# Patient Record
Sex: FEMALE | Race: WHITE | NOT HISPANIC OR LATINO | Employment: FULL TIME | URBAN - METROPOLITAN AREA
[De-identification: names, ages, dates, MRNs, and addresses within clinical notes are randomized per-mention and may not be internally consistent; named-entity substitution may affect disease eponyms.]

---

## 2023-03-07 ENCOUNTER — INITIAL PRENATAL (OUTPATIENT)
Dept: OBSTETRICS AND GYNECOLOGY | Facility: CLINIC | Age: 43
End: 2023-03-07
Payer: COMMERCIAL

## 2023-03-07 VITALS
BODY MASS INDEX: 22.86 KG/M2 | WEIGHT: 129 LBS | SYSTOLIC BLOOD PRESSURE: 97 MMHG | DIASTOLIC BLOOD PRESSURE: 66 MMHG | HEIGHT: 63 IN

## 2023-03-07 DIAGNOSIS — Z98.891 HISTORY OF C-SECTION: ICD-10-CM

## 2023-03-07 DIAGNOSIS — Z32.00 POSSIBLE PREGNANCY, NOT CONFIRMED: ICD-10-CM

## 2023-03-07 DIAGNOSIS — Z12.4 CERVICAL CANCER SCREENING: ICD-10-CM

## 2023-03-07 DIAGNOSIS — Z3A.01 LESS THAN 8 WEEKS GESTATION OF PREGNANCY: Primary | ICD-10-CM

## 2023-03-07 DIAGNOSIS — O09.521 MULTIGRAVIDA OF ADVANCED MATERNAL AGE IN FIRST TRIMESTER: ICD-10-CM

## 2023-03-07 DIAGNOSIS — Z34.91 PRENATAL CARE IN FIRST TRIMESTER: ICD-10-CM

## 2023-03-07 DIAGNOSIS — O28.3 ABNORMAL PRENATAL ULTRASOUND: ICD-10-CM

## 2023-03-07 LAB
B-HCG UR QL: POSITIVE
EXPIRATION DATE: ABNORMAL
GLUCOSE UR STRIP-MCNC: NEGATIVE MG/DL
INTERNAL NEGATIVE CONTROL: NEGATIVE
INTERNAL POSITIVE CONTROL: POSITIVE
Lab: ABNORMAL
PROT UR STRIP-MCNC: NEGATIVE MG/DL

## 2023-03-07 NOTE — PROGRESS NOTES
Initial OB Visit    Chief Complaint   Patient presents with   • Initial Prenatal Visit     Last pap 2020 normal        Anabelle Sandra is being seen today for her first obstetrical visit.  She is a 42 y.o.  at 8w5d gestation by LMP 23.     This is a planned pregnancy.   OB History    Para Term  AB Living   2 1 1     1   SAB IAB Ectopic Molar Multiple Live Births                    # Outcome Date GA Lbr Armen/2nd Weight Sex Delivery Anes PTL Lv   2 Current            1 Term 21   3062 g (6 lb 12 oz) F CS-Unspec          Current obstetric complaints: She reports having food aversions, nausea, and fatigue. No vomiting. Denies vaginal bleeding or abdominal cramping.    Prior obstetric issues, potential pregnancy concerns:    G1- Primary  section at term for non-reassuring fetal heart tracing. Developed fever during labor and got diagnosed with chorioamnionitis and received antibiotics. She had dilated to 8 cm. She was at [facility] in [place], [place].    G2- current      Family history of genetic issues (includes FOB): denies   Prior infections concerning in pregnancy (Rash, fever since LMP): denies   Varicella Hx: She has had the chicken pox as a kid.  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 History of HSV in self or partner? Denies   Prepregnancy weight: 129 lb     History reviewed. No pertinent past medical history.    Past Surgical History:   Procedure Laterality Date   •  SECTION         No current outpatient medications on file.    Allergies   Allergen Reactions   • Penicillins Hives   • Adhesive Tape Itching and Rash       Social History     Socioeconomic History   • Marital status:    Tobacco Use   • Smoking status: Never   Vaping Use   • Vaping Use: Never used   Substance and Sexual Activity   • Alcohol use: Not Currently   • Drug use: Never   • Sexual activity:  "Yes       History reviewed. No pertinent family history.    Review of systems     Constitutional: negative for chills, fevers and positive for fatigue  Eyes: negative  Ears, nose, mouth, throat, and face: negative for hearing loss and nasal congestion  Respiratory: negative for asthma and wheezing  Cardiovascular: negative for chest pain and dyspnea  Gastrointestinal: negative for dyspepsia, dysphagia abdominal pain  Genitourinary:negative for urinary incontinence  Integument/breast: negative for breast lump  Hematologic/lymphatic: negative for bleeding  Musculoskeletal:negative for aches  Neurological: negative for numbness/tingling  Behavioral/Psych: negative for anhedonia  Allergic/Immunologic: negative for rash, allergy    Objective    BP 97/66   Ht 160 cm (63\")   Wt 58.5 kg (129 lb)   LMP 01/05/2023   BMI 22.85 kg/m²     General Appearance:    Alert, cooperative, in no acute distress   Head:    Normocephalic, without obvious abnormality, atraumatic   Eyes:            Lids and lashes normal, conjunctivae and sclerae normal, no   icterus, no pallor, corneas clear   Ears:    Ears appear intact with no abnormalities noted   Neck:   No adenopathy, supple, trachea midline, no thyromegaly   Back:     No kyphosis present, no scoliosis present,                       Lungs:     No increased work of breathing, regular respirations     Heart:    Normal rate    Breast Exam:    No masses, No nipple discharge   Abdomen:     No masses, no organomegaly, soft, non-tender, non-distended, no guarding, no rebound tenderness   Genitalia:    Vulva - BUS-WNL, NEFG    Vagina - No discharge, No bleeding    Cervix - No Lesions, closed     Uterus - Consistent with 6 weeks    Adnexa - No masses, NT    Pelvimetry - clinically adequate, gynecoid pelvis     Extremities:   Moves all extremities well, no edema, no cyanosis, no              redness   Pulses:   Pulses palpable and equal bilaterally   Skin:   No bleeding, bruising or rash "   Lymph nodes:   No palpable adenopathy   Neurologic:   Sensation intact, A&O times 3      Assessment  1. Pregnancy at 7w1d by 7 week ultrasound   2. Prenatal care in first trimester  3. History of C/S x 1   4. AMA   5. Irregular cystic masses on ultrasound   6. Cervical cancer screening     Plan    - Initial labs ordered including OB panel with HIV, Varicella Zoster antibody, urine culture, urine drug screen, GC/CT/Trich probe, and pap smear with HPV cotesting.   - Ordered pelvic ultrasound to confirm pregnancy dating and it noted fetus measuring 7w1d by CRL with fetal cardiac activity noted. There was irregular cystic masses surrounding gestational sac that might represent partial molar pregnancy. Will obtain HCG level today in addition. Reviewed Uptodate that discussed that there may not be same elevated HCG levels that are typically associated  with complete molar pregnancy. Will plan to repeat ultrasound in 2 weeks for fetal viability and reevaluate irregular cystic masses surrounding the gestational sac.   - Will attempt to review records for [facility] regarding past delivery but was unable to find hospital on first attempt. Patient desires TOLAC and I would like to confirm via operative report that she had a low transverse  section during her first pregnancy. Reviewed risks of TOLAC including ~1% of uterine rupture that could lead to catastrophic consequences including placental abruption with subsequent fetal death. She is aware of risks and wishes to proceed with TOLAC which I do feel like she would be a good candidate given that her delivery was due to non-reassuring fetal heart tracing and she was dilating.   - Will plan to offer cell free DNA testing after 10 weeks given AMA.   - Will recommend starting ASA 81 mg daily given AMA status to help reduce risk of developing preeclampsia.   - Patient is on Prenatal vitamins.   - Problem list reviewed and updated.  - Reviewed routine prenatal care  with the office to include but not limited to: not to changing cat litter, food restrictions, avoidance of alcohol, tobacco and drugs and saunas/hot tubs, anticipated weight gain/nutrition requirements.  Reviewed nature of practice and hospital.  Reviewed recommended follow up, importance of compliance with care. We reviewed testing in pregnancy including HIV testing and urine drug screen.      We reviewed that at this time, her BMI is classified as BMI 18.5-24.9       Classification: normal weight.  We reviewed that in pregnancy, her recommended weight gain is 25-35 lb.  In the first trimester, caloric demand is typically not increased.  In the second and third trimester, the increased demand is approximately 350 and 450 calories respectively.    All questions answered.   Follow up in 2 weeks for fetal viability ultrasound and prenatal appointment.     Kusum De Leon MD

## 2023-03-08 LAB
ABO GROUP BLD: NORMAL
BASOPHILS # BLD AUTO: 0.1 X10E3/UL (ref 0–0.2)
BASOPHILS NFR BLD AUTO: 1 %
BLD GP AB SCN SERPL QL: NEGATIVE
EOSINOPHIL # BLD AUTO: 0.2 X10E3/UL (ref 0–0.4)
EOSINOPHIL NFR BLD AUTO: 2 %
ERYTHROCYTE [DISTWIDTH] IN BLOOD BY AUTOMATED COUNT: 12.7 % (ref 11.7–15.4)
HBV SURFACE AG SERPL QL IA: NEGATIVE
HCG INTACT+B SERPL-ACNC: NORMAL MIU/ML
HCT VFR BLD AUTO: 36.8 % (ref 34–46.6)
HCV IGG SERPL QL IA: NON REACTIVE
HGB BLD-MCNC: 12.5 G/DL (ref 11.1–15.9)
HIV 1+2 AB+HIV1 P24 AG SERPL QL IA: NON REACTIVE
IMM GRANULOCYTES # BLD AUTO: 0 X10E3/UL (ref 0–0.1)
IMM GRANULOCYTES NFR BLD AUTO: 0 %
LYMPHOCYTES # BLD AUTO: 2.9 X10E3/UL (ref 0.7–3.1)
LYMPHOCYTES NFR BLD AUTO: 36 %
MCH RBC QN AUTO: 32.2 PG (ref 26.6–33)
MCHC RBC AUTO-ENTMCNC: 34 G/DL (ref 31.5–35.7)
MCV RBC AUTO: 95 FL (ref 79–97)
MONOCYTES # BLD AUTO: 0.6 X10E3/UL (ref 0.1–0.9)
MONOCYTES NFR BLD AUTO: 7 %
NEUTROPHILS # BLD AUTO: 4.2 X10E3/UL (ref 1.4–7)
NEUTROPHILS NFR BLD AUTO: 54 %
PLATELET # BLD AUTO: 190 X10E3/UL (ref 150–450)
RBC # BLD AUTO: 3.88 X10E6/UL (ref 3.77–5.28)
RH BLD: POSITIVE
RPR SER QL: NON REACTIVE
RUBV IGG SERPL IA-ACNC: 3.79 INDEX
VZV IGG SER IA-ACNC: 3211 INDEX
WBC # BLD AUTO: 8 X10E3/UL (ref 3.4–10.8)

## 2023-03-09 LAB
AMPHETAMINES UR QL SCN: NEGATIVE NG/ML
BACTERIA UR CULT: NO GROWTH
BACTERIA UR CULT: NORMAL
BARBITURATES UR QL SCN: NEGATIVE NG/ML
BENZODIAZ UR QL SCN: NEGATIVE NG/ML
BZE UR QL SCN: NEGATIVE NG/ML
CANNABINOIDS UR QL SCN: NEGATIVE NG/ML
CREAT UR-MCNC: 129.1 MG/DL (ref 20–300)
LABORATORY COMMENT REPORT: ABNORMAL
METHADONE UR QL SCN: NEGATIVE NG/ML
OPIATES UR QL SCN: POSITIVE NG/ML
OXYCODONE+OXYMORPHONE UR QL SCN: NEGATIVE NG/ML
PCP UR QL: NEGATIVE NG/ML
PH UR: 8.2 [PH] (ref 4.5–8.9)
PROPOXYPH UR QL SCN: NEGATIVE NG/ML

## 2023-03-13 LAB
C TRACH RRNA CVX QL NAA+PROBE: NEGATIVE
CYTOLOGIST CVX/VAG CYTO: NORMAL
CYTOLOGY CVX/VAG DOC CYTO: NORMAL
CYTOLOGY CVX/VAG DOC THIN PREP: NORMAL
DX ICD CODE: NORMAL
HIV 1 & 2 AB SER-IMP: NORMAL
HPV GENOTYPE REFLEX: NORMAL
HPV I/H RISK 4 DNA CVX QL PROBE+SIG AMP: NEGATIVE
N GONORRHOEA RRNA CVX QL NAA+PROBE: NEGATIVE
OTHER STN SPEC: NORMAL
STAT OF ADQ CVX/VAG CYTO-IMP: NORMAL
T VAGINALIS RRNA SPEC QL NAA+PROBE: NEGATIVE

## 2023-03-15 ENCOUNTER — TELEPHONE (OUTPATIENT)
Dept: OBSTETRICS AND GYNECOLOGY | Facility: CLINIC | Age: 43
End: 2023-03-15
Payer: COMMERCIAL

## 2023-03-23 ENCOUNTER — OFFICE VISIT (OUTPATIENT)
Dept: OBSTETRICS AND GYNECOLOGY | Facility: CLINIC | Age: 43
End: 2023-03-23
Payer: COMMERCIAL

## 2023-03-23 VITALS
SYSTOLIC BLOOD PRESSURE: 113 MMHG | BODY MASS INDEX: 22.86 KG/M2 | HEIGHT: 63 IN | DIASTOLIC BLOOD PRESSURE: 72 MMHG | WEIGHT: 129 LBS

## 2023-03-23 DIAGNOSIS — O02.1 MISSED ABORTION: Primary | ICD-10-CM

## 2023-03-23 NOTE — H&P (VIEW-ONLY)
"Chief Complaint   Patient presents with   • Follow-up     MAB        SUBJECTIVE:     Anabelle Sandra is a 42 y.o.  who presents for fetal viability ultrasound that noted missed . The patient was last seen on 3/7/23 for new OB visit and was brought back today for viability ultrasound given irregular cystic masses surrounding gestational sac on initial dating ultrasound. The patient denies vaginal bleeding, abdominal cramping, nausea or vomiting. She is accompanied by her  today.     Past Medical History:   Diagnosis Date   • Dislocated shoulder 2022    right   • Missed  2023   • Shoulder instability, right       Past Surgical History:   Procedure Laterality Date   •  SECTION        Social History     Tobacco Use   • Smoking status: Never   Vaping Use   • Vaping Use: Never used   Substance Use Topics   • Alcohol use: Not Currently   • Drug use: Never     OB History    Para Term  AB Living   2 1 1     1   SAB IAB Ectopic Molar Multiple Live Births                    # Outcome Date GA Lbr Armen/2nd Weight Sex Delivery Anes PTL Lv   2 Current            1 Term 21   3062 g (6 lb 12 oz) F CS-Unspec           Review of Systems   Gastrointestinal: Negative for abdominal pain.   Genitourinary: Negative for vaginal bleeding and vaginal discharge.       OBJECTIVE:   Vitals:    23 1034   BP: 113/72   Weight: 58.5 kg (129 lb)   Height: 160 cm (62.99\")        Physical Exam  Vitals reviewed.   Constitutional:       General: She is not in acute distress.  HENT:      Head: Normocephalic and atraumatic.      Right Ear: External ear normal.      Left Ear: External ear normal.   Eyes:      Extraocular Movements: Extraocular movements intact.      Pupils: Pupils are equal, round, and reactive to light.   Pulmonary:      Effort: Pulmonary effort is normal. No respiratory distress.   Musculoskeletal:         General: No deformity. Normal range of motion.      Cervical " back: Normal range of motion and neck supple.   Skin:     General: Skin is warm and dry.   Neurological:      General: No focal deficit present.      Mental Status: She is alert and oriented to person, place, and time.   Psychiatric:         Mood and Affect: Mood normal.         Behavior: Behavior normal.         Thought Content: Thought content normal.         ASSESSMENT:     ICD-10-CM ICD-9-CM   1. Missed   O02.1 632       PLAN:   I reviewed ultrasound results with the patient and her partner and provided my condolences for their loss. I discussed my concern regarding for partial molar pregnancy given irregular cystic structures that were initially noted on dating ultrasound and were again seen today. I reviewed that I cannot be definitively sure of this diagnosis until pathology has returned but I have a high suspicion based on ultrasound findings and subsequently fetal demise likely due to triploidy. Reviewed what a molar pregnancy is and that diagnosis will be acquired through pathology so suction dilation and curettage is recommended vs conservative therapy with expectant management or medical management. Patient agrees to proceed with suction D&C and reviewed procedure along with risks including but not limited to bleeding, infection, damage to surrounding structures with uterine perforation that could require additional surgery, and anesthesia risks. Will order Doxycycline 200 mg PO for antibiotic prophylaxis. Will obtain CBC, T&S, and HCG on day of surgery. All questions and concerns answered. Case request placed for suction dilation and curettage for management of missed  and suspicion for partial molar pregnancy.     Kusum De Leon MD

## 2023-03-24 ENCOUNTER — TELEPHONE (OUTPATIENT)
Dept: OBSTETRICS AND GYNECOLOGY | Facility: CLINIC | Age: 43
End: 2023-03-24
Payer: COMMERCIAL

## 2023-03-24 PROBLEM — O02.1 MISSED ABORTION: Status: ACTIVE | Noted: 2023-03-24

## 2023-03-24 RX ORDER — PRENATAL VIT/IRON FUM/FOLIC AC 27MG-0.8MG
1 TABLET ORAL DAILY
COMMUNITY

## 2023-03-24 RX ORDER — MELATONIN
1000 DAILY
COMMUNITY

## 2023-03-24 RX ORDER — VALACYCLOVIR HYDROCHLORIDE 500 MG/1
500 TABLET, FILM COATED ORAL DAILY PRN
COMMUNITY

## 2023-03-24 NOTE — TELEPHONE ENCOUNTER
Pt called via hub. She states she was supposed to hear today about her surgery. I did not see a case request for this in the pt's chart but I did see the day noted in the pt's chart from her visit yesterday. Please advise.    Thanks,  Sven

## 2023-03-24 NOTE — TELEPHONE ENCOUNTER
Patient is calling again to check on scheduling D&C surgery. She would like to have the surgery Monday now.     Thanks

## 2023-03-24 NOTE — TELEPHONE ENCOUNTER
----- Message from Anabelle Sandra sent at 3/24/2023 12:32 PM EDT -----  Regarding: Surgery  Contact: [phone]  Hi Dr De Leon,  I still haven’t heard from scheduling but the more I’ve read about this situation, the sooner I would like to have surgery. I know we originally requested Wednesday but can we do it Monday? I’d even come today if you’d let me lol  Please let me know. Just want to get it over with.   Thank you  Anabelle

## 2023-03-27 ENCOUNTER — ANESTHESIA EVENT (OUTPATIENT)
Dept: PERIOP | Facility: HOSPITAL | Age: 43
End: 2023-03-27
Payer: COMMERCIAL

## 2023-03-27 ENCOUNTER — HOSPITAL ENCOUNTER (OUTPATIENT)
Facility: HOSPITAL | Age: 43
Setting detail: HOSPITAL OUTPATIENT SURGERY
Discharge: HOME | End: 2023-03-27
Attending: STUDENT IN AN ORGANIZED HEALTH CARE EDUCATION/TRAINING PROGRAM | Admitting: STUDENT IN AN ORGANIZED HEALTH CARE EDUCATION/TRAINING PROGRAM
Payer: COMMERCIAL

## 2023-03-27 ENCOUNTER — ANESTHESIA (OUTPATIENT)
Dept: PERIOP | Facility: HOSPITAL | Age: 43
End: 2023-03-27
Payer: COMMERCIAL

## 2023-03-27 VITALS
OXYGEN SATURATION: 100 % | RESPIRATION RATE: 16 BRPM | DIASTOLIC BLOOD PRESSURE: 59 MMHG | WEIGHT: 129 LBS | HEART RATE: 55 BPM | SYSTOLIC BLOOD PRESSURE: 105 MMHG | BODY MASS INDEX: 22.86 KG/M2 | TEMPERATURE: 98.2 F | HEIGHT: 63 IN

## 2023-03-27 DIAGNOSIS — O02.1 MISSED ABORTION: ICD-10-CM

## 2023-03-27 LAB
ABO GROUP BLD: NORMAL
BASOPHILS # BLD AUTO: 0.06 10*3/MM3 (ref 0–0.2)
BASOPHILS NFR BLD AUTO: 0.7 % (ref 0–1.5)
BLD GP AB SCN SERPL QL: NEGATIVE
DEPRECATED RDW RBC AUTO: 45.1 FL (ref 37–54)
EOSINOPHIL # BLD AUTO: 0.16 10*3/MM3 (ref 0–0.4)
EOSINOPHIL NFR BLD AUTO: 1.9 % (ref 0.3–6.2)
ERYTHROCYTE [DISTWIDTH] IN BLOOD BY AUTOMATED COUNT: 12.8 % (ref 12.3–15.4)
HCT VFR BLD AUTO: 38.9 % (ref 34–46.6)
HGB BLD-MCNC: 12.8 G/DL (ref 12–15.9)
IMM GRANULOCYTES # BLD AUTO: 0.03 10*3/MM3 (ref 0–0.05)
IMM GRANULOCYTES NFR BLD AUTO: 0.4 % (ref 0–0.5)
LYMPHOCYTES # BLD AUTO: 2.8 10*3/MM3 (ref 0.7–3.1)
LYMPHOCYTES NFR BLD AUTO: 33.3 % (ref 19.6–45.3)
MCH RBC QN AUTO: 31.8 PG (ref 26.6–33)
MCHC RBC AUTO-ENTMCNC: 32.9 G/DL (ref 31.5–35.7)
MCV RBC AUTO: 96.5 FL (ref 79–97)
MONOCYTES # BLD AUTO: 0.53 10*3/MM3 (ref 0.1–0.9)
MONOCYTES NFR BLD AUTO: 6.3 % (ref 5–12)
NEUTROPHILS NFR BLD AUTO: 4.83 10*3/MM3 (ref 1.7–7)
NEUTROPHILS NFR BLD AUTO: 57.4 % (ref 42.7–76)
NRBC BLD AUTO-RTO: 0 /100 WBC (ref 0–0.2)
PLATELET # BLD AUTO: 197 10*3/MM3 (ref 140–450)
PMV BLD AUTO: 11.1 FL (ref 6–12)
RBC # BLD AUTO: 4.03 10*6/MM3 (ref 3.77–5.28)
RH BLD: POSITIVE
T&S EXPIRATION DATE: NORMAL
WBC NRBC COR # BLD: 8.41 10*3/MM3 (ref 3.4–10.8)

## 2023-03-27 PROCEDURE — 85025 COMPLETE CBC W/AUTO DIFF WBC: CPT | Performed by: STUDENT IN AN ORGANIZED HEALTH CARE EDUCATION/TRAINING PROGRAM

## 2023-03-27 PROCEDURE — 86850 RBC ANTIBODY SCREEN: CPT | Performed by: STUDENT IN AN ORGANIZED HEALTH CARE EDUCATION/TRAINING PROGRAM

## 2023-03-27 PROCEDURE — 88305 TISSUE EXAM BY PATHOLOGIST: CPT | Performed by: STUDENT IN AN ORGANIZED HEALTH CARE EDUCATION/TRAINING PROGRAM

## 2023-03-27 PROCEDURE — 25010000002 ONDANSETRON PER 1 MG: Performed by: NURSE ANESTHETIST, CERTIFIED REGISTERED

## 2023-03-27 PROCEDURE — 25010000002 PROPOFOL 10 MG/ML EMULSION: Performed by: NURSE ANESTHETIST, CERTIFIED REGISTERED

## 2023-03-27 PROCEDURE — 86901 BLOOD TYPING SEROLOGIC RH(D): CPT | Performed by: STUDENT IN AN ORGANIZED HEALTH CARE EDUCATION/TRAINING PROGRAM

## 2023-03-27 PROCEDURE — 25010000002 DEXAMETHASONE SODIUM PHOSPHATE 20 MG/5ML SOLUTION: Performed by: NURSE ANESTHETIST, CERTIFIED REGISTERED

## 2023-03-27 PROCEDURE — 25010000002 KETOROLAC TROMETHAMINE PER 15 MG: Performed by: NURSE ANESTHETIST, CERTIFIED REGISTERED

## 2023-03-27 PROCEDURE — 86900 BLOOD TYPING SEROLOGIC ABO: CPT | Performed by: STUDENT IN AN ORGANIZED HEALTH CARE EDUCATION/TRAINING PROGRAM

## 2023-03-27 RX ORDER — SODIUM CHLORIDE, SODIUM LACTATE, POTASSIUM CHLORIDE, CALCIUM CHLORIDE 600; 310; 30; 20 MG/100ML; MG/100ML; MG/100ML; MG/100ML
9 INJECTION, SOLUTION INTRAVENOUS CONTINUOUS
Status: DISCONTINUED | OUTPATIENT
Start: 2023-03-27 | End: 2023-03-27 | Stop reason: HOSPADM

## 2023-03-27 RX ORDER — LIDOCAINE HYDROCHLORIDE 10 MG/ML
0.5 INJECTION, SOLUTION EPIDURAL; INFILTRATION; INTRACAUDAL; PERINEURAL ONCE AS NEEDED
Status: DISCONTINUED | OUTPATIENT
Start: 2023-03-27 | End: 2023-03-27 | Stop reason: HOSPADM

## 2023-03-27 RX ORDER — HYDROMORPHONE HYDROCHLORIDE 1 MG/ML
0.25 INJECTION, SOLUTION INTRAMUSCULAR; INTRAVENOUS; SUBCUTANEOUS
Status: DISCONTINUED | OUTPATIENT
Start: 2023-03-27 | End: 2023-03-27 | Stop reason: HOSPADM

## 2023-03-27 RX ORDER — LIDOCAINE HYDROCHLORIDE 20 MG/ML
INJECTION, SOLUTION INFILTRATION; PERINEURAL AS NEEDED
Status: DISCONTINUED | OUTPATIENT
Start: 2023-03-27 | End: 2023-03-27 | Stop reason: SURG

## 2023-03-27 RX ORDER — PROPOFOL 10 MG/ML
VIAL (ML) INTRAVENOUS AS NEEDED
Status: DISCONTINUED | OUTPATIENT
Start: 2023-03-27 | End: 2023-03-27 | Stop reason: SURG

## 2023-03-27 RX ORDER — PROMETHAZINE HYDROCHLORIDE 25 MG/1
25 SUPPOSITORY RECTAL ONCE AS NEEDED
Status: DISCONTINUED | OUTPATIENT
Start: 2023-03-27 | End: 2023-03-27 | Stop reason: HOSPADM

## 2023-03-27 RX ORDER — HYDROCODONE BITARTRATE AND ACETAMINOPHEN 7.5; 325 MG/1; MG/1
1 TABLET ORAL EVERY 4 HOURS PRN
Status: DISCONTINUED | OUTPATIENT
Start: 2023-03-27 | End: 2023-03-27 | Stop reason: HOSPADM

## 2023-03-27 RX ORDER — NALOXONE HCL 0.4 MG/ML
0.2 VIAL (ML) INJECTION AS NEEDED
Status: DISCONTINUED | OUTPATIENT
Start: 2023-03-27 | End: 2023-03-27 | Stop reason: HOSPADM

## 2023-03-27 RX ORDER — DROPERIDOL 2.5 MG/ML
0.62 INJECTION, SOLUTION INTRAMUSCULAR; INTRAVENOUS
Status: DISCONTINUED | OUTPATIENT
Start: 2023-03-27 | End: 2023-03-27 | Stop reason: HOSPADM

## 2023-03-27 RX ORDER — DIPHENHYDRAMINE HYDROCHLORIDE 50 MG/ML
12.5 INJECTION INTRAMUSCULAR; INTRAVENOUS
Status: DISCONTINUED | OUTPATIENT
Start: 2023-03-27 | End: 2023-03-27 | Stop reason: HOSPADM

## 2023-03-27 RX ORDER — PROMETHAZINE HYDROCHLORIDE 25 MG/1
25 TABLET ORAL ONCE AS NEEDED
Status: DISCONTINUED | OUTPATIENT
Start: 2023-03-27 | End: 2023-03-27 | Stop reason: HOSPADM

## 2023-03-27 RX ORDER — IPRATROPIUM BROMIDE AND ALBUTEROL SULFATE 2.5; .5 MG/3ML; MG/3ML
3 SOLUTION RESPIRATORY (INHALATION) ONCE AS NEEDED
Status: DISCONTINUED | OUTPATIENT
Start: 2023-03-27 | End: 2023-03-27 | Stop reason: HOSPADM

## 2023-03-27 RX ORDER — DOXYCYCLINE 100 MG/1
200 CAPSULE ORAL ONCE
Status: COMPLETED | OUTPATIENT
Start: 2023-03-27 | End: 2023-03-27

## 2023-03-27 RX ORDER — FLUMAZENIL 0.1 MG/ML
0.2 INJECTION INTRAVENOUS AS NEEDED
Status: DISCONTINUED | OUTPATIENT
Start: 2023-03-27 | End: 2023-03-27 | Stop reason: HOSPADM

## 2023-03-27 RX ORDER — DEXAMETHASONE SODIUM PHOSPHATE 4 MG/ML
INJECTION, SOLUTION INTRA-ARTICULAR; INTRALESIONAL; INTRAMUSCULAR; INTRAVENOUS; SOFT TISSUE AS NEEDED
Status: DISCONTINUED | OUTPATIENT
Start: 2023-03-27 | End: 2023-03-27 | Stop reason: SURG

## 2023-03-27 RX ORDER — MIDAZOLAM HYDROCHLORIDE 1 MG/ML
1 INJECTION INTRAMUSCULAR; INTRAVENOUS
Status: DISCONTINUED | OUTPATIENT
Start: 2023-03-27 | End: 2023-03-27 | Stop reason: HOSPADM

## 2023-03-27 RX ORDER — ONDANSETRON 2 MG/ML
4 INJECTION INTRAMUSCULAR; INTRAVENOUS ONCE AS NEEDED
Status: DISCONTINUED | OUTPATIENT
Start: 2023-03-27 | End: 2023-03-27 | Stop reason: HOSPADM

## 2023-03-27 RX ORDER — FENTANYL CITRATE 50 UG/ML
25 INJECTION, SOLUTION INTRAMUSCULAR; INTRAVENOUS
Status: DISCONTINUED | OUTPATIENT
Start: 2023-03-27 | End: 2023-03-27 | Stop reason: HOSPADM

## 2023-03-27 RX ORDER — HYDRALAZINE HYDROCHLORIDE 20 MG/ML
5 INJECTION INTRAMUSCULAR; INTRAVENOUS
Status: DISCONTINUED | OUTPATIENT
Start: 2023-03-27 | End: 2023-03-27 | Stop reason: HOSPADM

## 2023-03-27 RX ORDER — HYDROCODONE BITARTRATE AND ACETAMINOPHEN 5; 325 MG/1; MG/1
1 TABLET ORAL ONCE AS NEEDED
Status: COMPLETED | OUTPATIENT
Start: 2023-03-27 | End: 2023-03-27

## 2023-03-27 RX ORDER — EPHEDRINE SULFATE 50 MG/ML
5 INJECTION, SOLUTION INTRAVENOUS ONCE AS NEEDED
Status: DISCONTINUED | OUTPATIENT
Start: 2023-03-27 | End: 2023-03-27 | Stop reason: HOSPADM

## 2023-03-27 RX ORDER — KETOROLAC TROMETHAMINE 30 MG/ML
INJECTION, SOLUTION INTRAMUSCULAR; INTRAVENOUS AS NEEDED
Status: DISCONTINUED | OUTPATIENT
Start: 2023-03-27 | End: 2023-03-27 | Stop reason: SURG

## 2023-03-27 RX ORDER — SODIUM CHLORIDE 0.9 % (FLUSH) 0.9 %
3 SYRINGE (ML) INJECTION EVERY 12 HOURS SCHEDULED
Status: DISCONTINUED | OUTPATIENT
Start: 2023-03-27 | End: 2023-03-27 | Stop reason: HOSPADM

## 2023-03-27 RX ORDER — LABETALOL HYDROCHLORIDE 5 MG/ML
5 INJECTION, SOLUTION INTRAVENOUS
Status: DISCONTINUED | OUTPATIENT
Start: 2023-03-27 | End: 2023-03-27 | Stop reason: HOSPADM

## 2023-03-27 RX ORDER — DEXMEDETOMIDINE HYDROCHLORIDE 100 UG/ML
INJECTION, SOLUTION INTRAVENOUS AS NEEDED
Status: DISCONTINUED | OUTPATIENT
Start: 2023-03-27 | End: 2023-03-27 | Stop reason: SURG

## 2023-03-27 RX ORDER — IBUPROFEN 800 MG/1
800 TABLET ORAL EVERY 8 HOURS PRN
Qty: 30 TABLET | Refills: 1 | Status: SHIPPED | OUTPATIENT
Start: 2023-03-27

## 2023-03-27 RX ORDER — HYDROCODONE BITARTRATE AND ACETAMINOPHEN 5; 325 MG/1; MG/1
1 TABLET ORAL ONCE AS NEEDED
Status: DISCONTINUED | OUTPATIENT
Start: 2023-03-27 | End: 2023-03-27 | Stop reason: HOSPADM

## 2023-03-27 RX ORDER — FENTANYL CITRATE 50 UG/ML
50 INJECTION, SOLUTION INTRAMUSCULAR; INTRAVENOUS
Status: DISCONTINUED | OUTPATIENT
Start: 2023-03-27 | End: 2023-03-27 | Stop reason: HOSPADM

## 2023-03-27 RX ORDER — SODIUM CHLORIDE 0.9 % (FLUSH) 0.9 %
3-10 SYRINGE (ML) INJECTION AS NEEDED
Status: DISCONTINUED | OUTPATIENT
Start: 2023-03-27 | End: 2023-03-27 | Stop reason: HOSPADM

## 2023-03-27 RX ORDER — FAMOTIDINE 10 MG/ML
20 INJECTION, SOLUTION INTRAVENOUS ONCE
Status: COMPLETED | OUTPATIENT
Start: 2023-03-27 | End: 2023-03-27

## 2023-03-27 RX ORDER — ONDANSETRON 2 MG/ML
INJECTION INTRAMUSCULAR; INTRAVENOUS AS NEEDED
Status: DISCONTINUED | OUTPATIENT
Start: 2023-03-27 | End: 2023-03-27 | Stop reason: SURG

## 2023-03-27 RX ADMIN — FAMOTIDINE 20 MG: 10 INJECTION, SOLUTION INTRAVENOUS at 10:49

## 2023-03-27 RX ADMIN — KETOROLAC TROMETHAMINE 30 MG: 30 INJECTION, SOLUTION INTRAMUSCULAR at 12:04

## 2023-03-27 RX ADMIN — PROPOFOL 200 MG: 10 INJECTION, EMULSION INTRAVENOUS at 11:29

## 2023-03-27 RX ADMIN — DEXAMETHASONE SODIUM PHOSPHATE 8 MG: 4 INJECTION, SOLUTION INTRAMUSCULAR; INTRAVENOUS at 11:36

## 2023-03-27 RX ADMIN — ONDANSETRON 4 MG: 2 INJECTION INTRAMUSCULAR; INTRAVENOUS at 12:04

## 2023-03-27 RX ADMIN — SODIUM CHLORIDE, POTASSIUM CHLORIDE, SODIUM LACTATE AND CALCIUM CHLORIDE 9 ML/HR: 600; 310; 30; 20 INJECTION, SOLUTION INTRAVENOUS at 10:49

## 2023-03-27 RX ADMIN — HYDROCODONE BITARTRATE AND ACETAMINOPHEN 1 TABLET: 5; 325 TABLET ORAL at 12:44

## 2023-03-27 RX ADMIN — LIDOCAINE HYDROCHLORIDE 100 MG: 20 INJECTION, SOLUTION INFILTRATION; PERINEURAL at 11:29

## 2023-03-27 RX ADMIN — DEXMEDETOMIDINE 10 MCG: 100 INJECTION, SOLUTION, CONCENTRATE INTRAVENOUS at 11:49

## 2023-03-27 RX ADMIN — DOXYCYCLINE 200 MG: 100 CAPSULE ORAL at 10:48

## 2023-03-27 NOTE — ANESTHESIA POSTPROCEDURE EVALUATION
Patient: Anabelle Sandra    Procedure Summary     Date: 23 Room / Location: [facility] OR   [facility] MAIN OR    Anesthesia Start: 1121 Anesthesia Stop: 1224    Procedure: DILATATION AND CURETTAGE WITH SUCTION (Vagina) Diagnosis:       Missed       (Missed  [O02.1])    Surgeons: Kusum De Leon MD Provider: Willie Laura MD    Anesthesia Type: general ASA Status: 1          Anesthesia Type: general    Vitals  Vitals Value Taken Time   /63 23 1246   Temp 36.8 °C (98.2 °F) 23 1220   Pulse 58 23 1249   Resp 14 23 1245   SpO2 100 % 23 1250   Vitals shown include unvalidated device data.        Post Anesthesia Care and Evaluation    Patient location during evaluation: PACU  Patient participation: complete - patient participated  Level of consciousness: awake and alert  Pain management: adequate    Airway patency: patent  Anesthetic complications: No anesthetic complications    Cardiovascular status: acceptable  Respiratory status: acceptable  Hydration status: acceptable    Comments: --------------------            23               1255     --------------------   BP:       101/59     Pulse:      57       Resp:       16       Temp:                SpO2:      100%     --------------------

## 2023-03-27 NOTE — ANESTHESIA POSTPROCEDURE EVALUATION
Patient: Anabelle Sandra    Procedure Summary     Date: 23 Room / Location: [facility] OR   [facility] MAIN OR    Anesthesia Start: 1121 Anesthesia Stop: 1224    Procedure: DILATATION AND CURETTAGE WITH SUCTION (Vagina) Diagnosis:       Missed       (Missed  [O02.1])    Surgeons: Kusum De Leon MD Provider: Willie Laura MD    Anesthesia Type: general ASA Status: 1          Anesthesia Type: general    Vitals  Vitals Value Taken Time   /63 23 1246   Temp 36.8 °C (98.2 °F) 23 1220   Pulse 58 23 1249   Resp 14 23 1245   SpO2 100 % 23 1250   Vitals shown include unvalidated device data.        Post Anesthesia Care and Evaluation    Patient location during evaluation: PACU  Patient participation: complete - patient participated  Level of consciousness: awake and alert  Pain management: adequate    Airway patency: patent  Anesthetic complications: No anesthetic complications    Cardiovascular status: acceptable  Respiratory status: acceptable  Hydration status: acceptable    Comments: --------------------            23               1255     --------------------   BP:       101/59     Pulse:      57       Resp:       16       Temp:                SpO2:      100%     --------------------

## 2023-03-27 NOTE — ANESTHESIA PROCEDURE NOTES
Airway  Urgency: elective    Date/Time: 3/27/2023 11:31 AM  Airway not difficult    General Information and Staff    Patient location during procedure: OR  Anesthesiologist: Willie Laura MD  CRNA/CAA: Elizabeth Mcnally CRNA    Indications and Patient Condition    Preoxygenated: yes  Mask difficulty assessment: 1 - vent by mask    Final Airway Details  Final airway type: supraglottic airway      Successful airway: classic  Size 4     Number of attempts at approach: 1  Assessment: lips, teeth, and gum same as pre-op    Additional Comments  Pt to OR and positioned self to OR table. Monitors applied. PreO2: SIVI and easy insertion LMA. ETCO2 +, Equal and bilateral chest rise

## 2023-03-27 NOTE — OP NOTE
OPERATIVE NOTE    Anabelle Sandra   1980  [id number]    SURGEON: Kusum De Leon MD     PREOPERATIVE DIAGNOSIS:  1. Missed     POSTOPERATIVE DIAGNOSIS:  1. Same     ANESTEHSIA: GETA    OPERATION: Suction dilation and curettage    ESTIMATED BLOOD LOSS: 400 cc      COMPLICATIONS: None.    DRAINS: None.    SPECIMENS: Products of conception    COUNTS: Sponge, instrument, lap and needle counts were correct x2.    INDICATIONS: The patient is a 42 y.o. female  diagnosed with a missed  measuring 8w0d on 3/23/23 and concern for partial molar pregnancy based on ultrasound findings with irregular cystic masses surrounding gestational sac.The patient desired definitive surgical management. Her preoperative hematocrit was 38.9  and blood type is Rh O positive . The risks, benefits, alternatives and indications for the procedure were discussed with the patient. The patient voiced understanding and desired to proceed. All questions were answered.    FINDINGS:  A bimanual exam revealed a slightly dilated cervix with a 9 week size uterus. There was no active vaginal bleeding. A moderate amount of uterine contents consisitent with products of conception were evacuated during the suction D&C.     OPERATIVE PROCEDURE IN DETAIL: After informed consent was obtained, the patient was taken to the operating room and placed in the dorsal supine position.  General anesthesia was administered. She was then placed in the dorsal lithotomy position using Miguel stirrups. She was prepped and draped in the usual sterile fashion, and an in-and-out catheterization was performed. The patient received doxycycline 200 mg PO for antibiotic prophylaxis, and SCDs were placed. A bimanual exam revealed an approximately 9 week-sized uterus with a slightly dilated cervix. An open-sided speculum was placed in the vagina and the cervix visualized.  The anterior lip of the cervix was grasped with a single toothed tenaculum. The cervix was  brought down in a caudal direction. The cervix was serially dilated using Michel dilators up to a 20 Welsh. An 8 mm curved suction curette was then placed in the uterine cavity and passed easily. Using appropriate pressure, the suction was applied, and a moderate amount of uterine contents consistent with products of conception was removed from the uterus. After multiple passes using the suction curette, a nonserrated banjo curette was used. A 360 degree curettage was performed using the nonserrated banjo currette, until a gritty texture was noted. The suction curette was reintroduced and minimal blood was obtained. The single toothed tenaculum was removed, and there was minimal bleeding from the cervical os. Pressure was held on the tenaculum sites. After pressure was applied, the sites were reinspected, and hemostasis was obtained. The patient tolerated the procedure well and was transferred to the PACU in stable condition.     Kusum De Leon MD

## 2023-03-27 NOTE — DISCHARGE INSTRUCTIONS
Discharge instructions reviewed include:  - nothing in the vagina for 2 weeks  - contact MD Office with increasing pain, fever (temp of 100.4 or greater), heavy vaginal bleeding, or other concerning symptom  - if you are having a medical emergency, go to the Emergency Department  - vaginal bleeding/spotting is normal, but should get lighter over the next 3-7 days  - mild to moderate cramping is expected.    - do not take more than one type of NSAID (such as mobic, ibuprofen, naproxen).  You may alternate an NSAID and Tylenol (acetaminophen)    HOW DO I REST MY PELVIS?  For as long as told by your health care provider:  Do not have sex, sexual stimulation, or an orgasm.  Do not use tampons. Do not douche. Do not put anything in your vagina.  Avoid activities that take a lot of effort (are strenuous).  Avoid any activity in which your pelvic muscles could become strained.

## 2023-03-27 NOTE — ANESTHESIA PREPROCEDURE EVALUATION
Anesthesia Evaluation     Patient summary reviewed and Nursing notes reviewed                Airway   Mallampati: I  TM distance: >3 FB  Neck ROM: full  No difficulty expected  Dental      Pulmonary - negative pulmonary ROS   Cardiovascular - negative cardio ROS    Rhythm: regular  Rate: normal        Neuro/Psych- negative ROS  GI/Hepatic/Renal/Endo - negative ROS     Musculoskeletal (-) negative ROS    Abdominal    Substance History - negative use     OB/GYN negative ob/gyn ROS         Other                        Anesthesia Plan    ASA 1     general     (I have reviewed the patient's history with the patient and the chart, including all pertinent laboratory results and imaging. I have explained the risks of anesthesia including but not limited to dental damage, corneal abrasion, nerve injury, MI, stroke, and death. Questions asked and answered. Anesthetic plan discussed with patient and team as indicated. Patient expressed understanding of the above.  )  intravenous induction     Anesthetic plan, risks, benefits, and alternatives have been provided, discussed and informed consent has been obtained with: patient and spouse/significant other.        CODE STATUS:

## 2023-03-27 NOTE — INTERVAL H&P NOTE
H&P reviewed. The patient was examined and there are no changes to the H&P. Patient denies vaginal bleeding or abdominal cramping today. I again reviewed procedure of suction dilation and curettage for management of missed  with concern for partial molar pregnancy and discussed risks including but not limited to bleeding, infection, and damage to surrounding structures that may require additional surgery. Consents signed. Antibiotics Ppx: Doxycycline 200 mg PO ordered. DVT Ppx: SCDs ordered. Type and screen collected and CBC and HCG ordered. Will proceed with scheduled surgery. Patient to follow up in 1 week for HCG level and 2 weeks for postop appt. All questions answered.     Kusum De Leon MD

## 2023-03-27 NOTE — ADDENDUM NOTE
Addendum  created 03/27/23 1822 by Willie Laura MD    Attestation recorded in Intraprocedure, Intraprocedure Attestations filed

## 2023-03-28 ENCOUNTER — TELEPHONE (OUTPATIENT)
Dept: OBSTETRICS AND GYNECOLOGY | Facility: CLINIC | Age: 43
End: 2023-03-28
Payer: COMMERCIAL

## 2023-03-28 NOTE — TELEPHONE ENCOUNTER
Patient needs 2 week follow up from surgery the week of 4/10. Can go to any location. Where would you like her scheduled? Thank You.

## 2023-04-03 DIAGNOSIS — O02.1 MISSED ABORTION: Primary | ICD-10-CM

## 2023-04-04 LAB — HCG INTACT+B SERPL-ACNC: 3153 MIU/ML

## 2023-04-06 LAB
LAB AP CASE REPORT: NORMAL
LAB AP DIAGNOSIS COMMENT: NORMAL
PATH REPORT.ADDENDUM SPEC: NORMAL
PATH REPORT.FINAL DX SPEC: NORMAL
PATH REPORT.GROSS SPEC: NORMAL

## 2023-04-12 ENCOUNTER — TELEPHONE (OUTPATIENT)
Dept: OBSTETRICS AND GYNECOLOGY | Facility: CLINIC | Age: 43
End: 2023-04-12
Payer: COMMERCIAL

## 2023-04-12 DIAGNOSIS — O28.3 ABNORMAL PREGNANCY US: Primary | ICD-10-CM

## 2023-04-12 NOTE — TELEPHONE ENCOUNTER
Called and spoke with patient regarding pathology results following suction dilation and curettage that are concerning for a partial molar pregnancy. We are still awaiting on molecular genetic testing which may take weeks. Recommended plan for following weekly until negative and then repeating weekly for 3 consecutive weeks of negative. Will then determine how long to follow monthly with literature stating 3-6 months. All questions and concerns answered. HCG levels placed weekly x 4.     Kusum De Leon MD

## 2023-04-18 ENCOUNTER — TELEPHONE (OUTPATIENT)
Dept: OBSTETRICS AND GYNECOLOGY | Facility: CLINIC | Age: 43
End: 2023-04-18
Payer: COMMERCIAL

## 2023-04-18 NOTE — TELEPHONE ENCOUNTER
Please call patient back, she has further questions regarding the lab work for her and her .    Thanks!

## 2023-04-19 DIAGNOSIS — O28.3 ABNORMAL PREGNANCY US: ICD-10-CM

## 2023-04-20 LAB — HCG INTACT+B SERPL-ACNC: 85 MIU/ML

## 2023-05-04 DIAGNOSIS — O28.3 ABNORMAL PREGNANCY US: Primary | ICD-10-CM

## 2023-05-05 ENCOUNTER — TELEPHONE (OUTPATIENT)
Dept: OBSTETRICS AND GYNECOLOGY | Facility: CLINIC | Age: 43
End: 2023-05-05
Payer: COMMERCIAL

## 2023-05-05 LAB — HCG INTACT+B SERPL-ACNC: 13 MIU/ML

## 2023-05-05 NOTE — TELEPHONE ENCOUNTER
Roma,     Her HCG is not increasing. So traditionally the plan is just to be patient and follow her HCG until it gets to negative. (the values are 12.30 and 13 - 6 days apart).  I have seen it take several weeks to go all the way down to < 5, which is considered normal.    Our only other option is to send her to gyn onc for persistent trophoblastic disease and they would give her chemotherapy with methotrexate at least if not multiple agents.      We typically reserve that for someone who is increasing and who has values that would be significantly higher (100,000's to millions +)     So, I would just really caution her at this point to be patient and continue to give time for her body to recover and take care of this.     Please let her know.     Thanks,   Dr. Bradshaw

## 2023-05-05 NOTE — TELEPHONE ENCOUNTER
Patient is calling regarding her HCG results. She is worried about it not decreasing, and her possibly having a molar pregnancy. She is wanting a plan moving forward on what she should do.     Please advise,   Thanks

## 2023-05-08 ENCOUNTER — TELEPHONE (OUTPATIENT)
Dept: OBSTETRICS AND GYNECOLOGY | Facility: CLINIC | Age: 43
End: 2023-05-08
Payer: COMMERCIAL

## 2023-05-08 NOTE — TELEPHONE ENCOUNTER
----- Message from Kusum De Leon MD sent at 5/8/2023  3:53 PM EDT -----  Please call and let her know that her HCG level returned 13 which is unchanged from previous level last week and I recommend repeating it again next week. If it is not going down, then I will send a referral to GYN/Onc for treatment of partial molar pregnancy. Thanks!

## 2023-05-08 NOTE — TELEPHONE ENCOUNTER
Spoke with Anabelle to let her know that Dr De Leon said that your HCG level returned at 13, which is unchanged from previous level last week. Dr De Leon recommends repeating the HCG testing next week. If it is not going down, then she will send a referral to the GYN/ONC for treatment of a partial molar pregnancy. Thank you.

## 2023-05-15 DIAGNOSIS — O02.0 MOLAR PREGNANCY: Primary | ICD-10-CM

## 2023-05-19 DIAGNOSIS — O02.0 MOLAR PREGNANCY: Primary | ICD-10-CM

## 2023-05-30 DIAGNOSIS — O02.0 MOLAR PREGNANCY: Primary | ICD-10-CM

## 2023-06-01 DIAGNOSIS — B00.9 HSV (HERPES SIMPLEX VIRUS) INFECTION: Primary | ICD-10-CM

## 2023-06-01 RX ORDER — VALACYCLOVIR HYDROCHLORIDE 500 MG/1
1000 TABLET, FILM COATED ORAL DAILY PRN
Qty: 90 TABLET | Refills: 3 | Status: SHIPPED | OUTPATIENT
Start: 2023-06-01

## 2023-07-20 DIAGNOSIS — O02.0 MOLAR PREGNANCY: ICD-10-CM

## 2023-07-21 LAB — HCG INTACT+B SERPL-ACNC: <1 MIU/ML

## 2023-07-28 ENCOUNTER — TELEPHONE (OUTPATIENT)
Dept: OBSTETRICS AND GYNECOLOGY | Facility: CLINIC | Age: 43
End: 2023-07-28
Payer: COMMERCIAL

## 2023-07-28 NOTE — TELEPHONE ENCOUNTER
Spoke with Anabelle to let her know that Dr De Leon said that your pregnancy hormone returned negative. She recommends repeating in another month. Thank you

## 2023-07-28 NOTE — TELEPHONE ENCOUNTER
----- Message from Kusum De Leon MD sent at 7/28/2023 10:12 AM EDT -----  Yohan Brandon,    Would you call her and let her know that her pregnancy hormone returned negative. Recommend repeating in another month. Thanks!

## 2023-08-17 DIAGNOSIS — Z87.59 HISTORY OF MOLAR PREGNANCY: Primary | ICD-10-CM

## 2023-11-09 ENCOUNTER — TELEPHONE (OUTPATIENT)
Dept: OBSTETRICS AND GYNECOLOGY | Facility: CLINIC | Age: 43
End: 2023-11-09
Payer: COMMERCIAL

## 2023-11-09 NOTE — TELEPHONE ENCOUNTER
Patient here for HCG for continued monitoring from previous molar pregnancy. Orders placed per Dr. De Leon.

## 2025-01-15 DIAGNOSIS — B00.9 HSV (HERPES SIMPLEX VIRUS) INFECTION: ICD-10-CM

## 2025-01-15 RX ORDER — VALACYCLOVIR HYDROCHLORIDE 500 MG/1
1000 TABLET, FILM COATED ORAL DAILY PRN
Qty: 90 TABLET | Refills: 3 | Status: SHIPPED | OUTPATIENT
Start: 2025-01-15

## (undated) DEVICE — Device

## (undated) DEVICE — VACURETTE CRV RIGD 8MM DISP

## (undated) DEVICE — STRAP STIRUP WO/ RNG

## (undated) DEVICE — CANSTR SXN UTER NO FLTR SURG

## (undated) DEVICE — ST COL BERKELY TBG

## (undated) DEVICE — SACK GZ PERM

## (undated) DEVICE — HOSE BT TO BT VAC CURETTAGE SNGL PT USE EA/10

## (undated) DEVICE — TBG W FLTR FOR BERKELEY SYSTEM

## (undated) DEVICE — GLV SURG BIOGEL LTX PF 6

## (undated) DEVICE — LOU D & C HYSTEROSCOPY: Brand: MEDLINE INDUSTRIES, INC.